# Patient Record
(demographics unavailable — no encounter records)

---

## 2024-10-24 NOTE — DISCUSSION/SUMMARY
[FreeTextEntry1] : Pt with fever in setting of rhinoenterovirus infection with cough. Advised to resume albuterol for nighttime cough and continue every 4 hours if notable improvement. Also possible post-nasal drip component to cough, will send flonase. Expectant care. Follow up as needed for new fever trend or worsening symptoms. Return precautions discussed if fever continues or does not trend down.

## 2024-10-24 NOTE — PHYSICAL EXAM
[Alert] : alert [EOMI] : grossly EOMI [Conjuctival Injection] : no conjunctival injection [Erythema] : no erythema [Bulging] : not bulging [Erythematous Oropharynx] : nonerythematous oropharynx [Clear to Auscultation Bilaterally] : clear to auscultation bilaterally [Wheezing] : no wheezing [Rales] : no rales [Rhonchi] : no rhonchi [No Abnormal Lymph Nodes Palpated] : no abnormal lymph nodes palpated [NL] : warm, clear [FreeTextEntry1] : in wheelchair [FreeTextEntry3] : scarring on TM [FreeTextEntry4] : congestion

## 2024-10-24 NOTE — HISTORY OF PRESENT ILLNESS
[de-identified] : hfu [FreeTextEntry6] : Diagnosed with REV at [facility], has cough, congestion, fever x 3-4 days. Had fever this morning to 101F. Mom has given Tylenol for fever control. Pt also complaining about ear pain. No changes in appetite or behavior. Coughing waking her up at night.

## 2024-10-24 NOTE — HISTORY OF PRESENT ILLNESS
[de-identified] : hfu [FreeTextEntry6] : Diagnosed with REV at [facility], has cough, congestion, fever x 3-4 days. Had fever this morning to 101F. Mom has given Tylenol for fever control. Pt also complaining about ear pain. No changes in appetite or behavior. Coughing waking her up at night.

## 2024-11-05 NOTE — HISTORY OF PRESENT ILLNESS
[Mother] : mother [Fat free ___ oz/d] : consumes [unfilled] oz of fat free cow's milk per day [Fruit] : fruit [Vegetables] : vegetables [Meat] : meat [Eggs] : eggs [Fish] : fish [___ stools every other day] : [unfilled]  stools every other day [Firm] : stools are firm consistency [Brushing teeth] : Brushing teeth [No] : Patient does not go to dentist yearly [Vitamin] : Primary Fluoride Source: Vitamin [In nursery school] : In nursery school [Playtime (60 min/d)] : Playtime 60 min a day [Child given choices] : Child given choices [Up to date] : Up to date

## 2024-11-11 NOTE — PHYSICAL EXAM
[Acute Distress] : no acute distress [Cerumen in canal] : cerumen in canal [Discharge in canal] : discharge in canal [Inflammation of canal] : inflammation of canal [Erythema of canal] : erythema of canal [Bilateral] : (bilateral) [NL] : soft, nontender, nondistended, normal bowel sounds, no hepatosplenomegaly [No Abnormal Lymph Nodes Palpated] : no abnormal lymph nodes palpated [Warm] : warm

## 2024-11-11 NOTE — HISTORY OF PRESENT ILLNESS
[de-identified] : Fever, Ear Pain [FreeTextEntry6] : SALO GUZMAN is a 3 year old female presenting for complaints of ear wax dripping from the ear and fever this morning.  Decreased appetite, drinking well.  No other symptoms.

## 2024-11-11 NOTE — DISCUSSION/SUMMARY
[FreeTextEntry1] : 3 yo here with febrile illness and B otitis externa.  Patient has been diagnosed with otitis externa or swimmer's ear. Use ear drops as prescribed and avoid contact with water. After showering, can use a blow-dryer on cool to dry excess fluid from the canal (with assistance from the parent) Pain should resolve within 36-48 hours after initiation of therapy. If pain is not improving, patient should contact provider. Can use OTC pain medication such as Tylenol or Ibuprofen as needed for pain.  Discussed supportive care for fever, likely viral etiology.  Increased fluids can treat Tylenol/Motrin PRN.  Advised f/u if fever persists symptoms worsen.

## 2024-11-20 NOTE — DISCUSSION/SUMMARY
[FreeTextEntry1] : Pt with perforated TM in right ear and left AOM. Mom endorses ear drainage from right ear previously. Will send Amoxicillin x 10 day course for TM perforation. Complete antibiotic course. Potential side effect of antibiotics includes but not limited to diarrhea. Provide ibuprofen as needed for pain or fever. If no improvement within 48 hours return for re-evaluation.  Odomzo Counseling- I discussed with the patient the risks of Odomzo including but not limited to nausea, vomiting, diarrhea, constipation, weight loss, changes in the sense of taste, decreased appetite, muscle spasms, and hair loss.  The patient verbalized understanding of the proper use and possible adverse effects of Odomzo.  All of the patient's questions and concerns were addressed.

## 2024-11-20 NOTE — HISTORY OF PRESENT ILLNESS
[de-identified] : right ear pain and cough [FreeTextEntry6] : Still complaining of right ear pain and mild cough. Did not apply ear drops as per last note, mom was not aware drops were sent. No fevers.   Reviewed prior notes, meds.

## 2024-11-20 NOTE — PHYSICAL EXAM
[Bulging] : bulging [Erythema] : erythema [Purulent Effusion] : no purulent effusion [Perforated] : perforated [No Abnormal Lymph Nodes Palpated] : no abnormal lymph nodes palpated [NL] : warm, clear

## 2024-11-20 NOTE — HISTORY OF PRESENT ILLNESS
[de-identified] : right ear pain and cough [FreeTextEntry6] : Still complaining of right ear pain and mild cough. Did not apply ear drops as per last note, mom was not aware drops were sent. No fevers.   Reviewed prior notes, meds.

## 2024-11-20 NOTE — DISCUSSION/SUMMARY
[FreeTextEntry1] : Pt with perforated TM in right ear and left AOM. Mom endorses ear drainage from right ear previously. Will send Amoxicillin x 10 day course for TM perforation. Complete antibiotic course. Potential side effect of antibiotics includes but not limited to diarrhea. Provide ibuprofen as needed for pain or fever. If no improvement within 48 hours return for re-evaluation.

## 2025-01-07 NOTE — PHYSICAL EXAM
[Clear] : right tympanic membrane clear [Clear Effusion] : clear effusion [Erythema] : no erythema [Bulging] : not bulging [Purulent Effusion] : no purulent effusion [Clear to Auscultation Bilaterally] : clear to auscultation bilaterally [Wheezing] : no wheezing [Rales] : no rales [Crackles] : no crackles [Rhonchi] : no rhonchi [NL] : regular rate and rhythm, normal S1, S2 audible, no murmurs [FreeTextEntry3] : cerumen in left ear canal, cleared with ear curette

## 2025-01-07 NOTE — HISTORY OF PRESENT ILLNESS
[de-identified] : diarrhea, fever, chills, ear pain [FreeTextEntry6] : Began having fever and chills yesterday evening. Complained of right ear pain. Had multiple watery loose stools overnight. No vomiting. No cough or congestion.  Hospitalized 1 month ago for HMPV, required respiratory support.

## 2025-01-07 NOTE — DISCUSSION/SUMMARY
[FreeTextEntry1] : Pt with likely viral illness vs viral gastroenteritis. Will send swab for testing given recent hospitalization for respiratory distress. Consider tamiflu if flu+ (still within 24hrs of symptom onset). Continue supportive care and maintaining hydration in setting of diarrheal illness. Return precautions given for ear pain, fever, or worsening/concerning changes.

## 2025-01-10 NOTE — PHYSICAL EXAM
[Acute Distress] : no acute distress [Alert] : alert [Toxic] : not toxic [Conjuctival Injection] : conjunctival injection [Discharge] : discharge [Right] : (right) [Erythema] : no erythema [Bulging] : bulging [Purulent Effusion] : purulent effusion [FreeTextEntry3] : left TM perforation, right TM bullous myringitis

## 2025-01-10 NOTE — DISCUSSION/SUMMARY
[FreeTextEntry1] : Pt with bilateral AOM seen on exam, with secondary bacterial conjunctivitis. Will treat with PO Cefdinir BID x 10 days and send Polytrim drops for conjunctivitis. Complete antibiotic course. Potential side effect of antibiotics includes but not limited to diarrhea. Provide ibuprofen as needed for pain or fever. If no improvement within 48 hours return for re-evaluation.   Untreated ear infections may lead to: Permanent hearing loss Problems with speech and language development Spread of infection to neighboring tissues and organs, such as mastoiditis or meningitis  Treat as directed and follow up as needed for fever trend, new, or worsening symptoms. Call or return if rash or significant vomiting develops after starting medication. Some side effects of medication that may not need special treatments include loose BMs.

## 2025-01-10 NOTE — HISTORY OF PRESENT ILLNESS
[de-identified] : R EYE DC [FreeTextEntry6] : Having right eye discharge since yesterday. Was complaining of right ear pain and then stopped. Diarrhea has improved, no stools since yesterday. Eating and drinking. No fevers.

## 2025-03-14 NOTE — HISTORY OF PRESENT ILLNESS
[de-identified] : Stomach pain  [FreeTextEntry6] : Pt with intermittent abdominal pain x 2 weeks on and off. Will point to RLQ for pain. Mom has noticed fishy odor to urine but has not complained of pain with urination. Currently potty training. Two weeks ago had loose stools but they self resolved, now stooling normally. No pain with BM. No fevers, vomiting, nausea. Eating and drinking normally. No throat pain.

## 2025-03-14 NOTE — HISTORY OF PRESENT ILLNESS
[de-identified] : Stomach pain  [FreeTextEntry6] : Pt with intermittent abdominal pain x 2 weeks on and off. Will point to RLQ for pain. Mom has noticed fishy odor to urine but has not complained of pain with urination. Currently potty training. Two weeks ago had loose stools but they self resolved, now stooling normally. No pain with BM. No fevers, vomiting, nausea. Eating and drinking normally. No throat pain.

## 2025-03-14 NOTE — ADDENDUM
[FreeTextEntry1] : Mother called to state she had another loose stool. Will send stool PCR and call mom back.

## 2025-03-14 NOTE — HISTORY OF PRESENT ILLNESS
[de-identified] : Stomach pain  [FreeTextEntry6] : Pt with intermittent abdominal pain x 2 weeks on and off. Will point to RLQ for pain. Mom has noticed fishy odor to urine but has not complained of pain with urination. Currently potty training. Two weeks ago had loose stools but they self resolved, now stooling normally. No pain with BM. No fevers, vomiting, nausea. Eating and drinking normally. No throat pain.

## 2025-03-14 NOTE — DISCUSSION/SUMMARY
[FreeTextEntry1] : Pt with intermittent RLQ abdominal pain in setting of recent diarrheal illness and malodorous urine. Urine bag placed for clean catch, POC UA negative. Advised to eliminate dairy for now, may be causing pain/discomfort with recent GI illness. No suspicion for strep today. Low suspicion for intraabdominal process, overall benign physical exam. Consider abdominal US to evaluate for possible appendicitis given location of abdominal pain.

## 2025-03-14 NOTE — PHYSICAL EXAM
[Soft] : soft [Normal Bowel Sounds] : normal bowel sounds [Normal External Genitalia] : normal external genitalia [NL] : warm, clear [Erythematous Oropharynx] : nonerythematous oropharynx [Enlarged Tonsils] : tonsils not enlarged [Vesicles] : no vesicles [Exudate] : no exudate [Ulcerative Lesions] : no ulcerative lesions [Palate petechiae] : palate without petechiae [Tender] : nontender [Distended] : nondistended [Tenderness with Palpation] : no tenderness with palpation [Vaginal Discharge] : no vaginal discharge

## 2025-05-12 NOTE — PHYSICAL EXAM
[Clear] : right tympanic membrane clear [Erythema] : erythema [Purulent Effusion] : purulent effusion [Mucoid Discharge] : mucoid discharge [NL] : warm, clear

## 2025-05-12 NOTE — DISCUSSION/SUMMARY
[FreeTextEntry1] : Anticipatory guidance and parent education given. Take antibiotic as prescribed. Supportive care. Tylenol or Motrin as needed for discomfort/fever. Nasal saline and suction as needed. Follow up in 3-4 weeks for re-check of affected ear(s). Follow up sooner if symptoms persist or worsen.

## 2025-05-12 NOTE — HISTORY OF PRESENT ILLNESS
[de-identified] : Runny nose, Cough, Congestion [FreeTextEntry6] : BIB mom with c/o cough and congestion; no fever

## 2025-06-25 NOTE — PHYSICAL EXAM
[Cerumen in canal] : cerumen in canal [Right] : (right) [Clear Effusion] : clear effusion [Mucoid Discharge] : mucoid discharge [Inflamed Nasal Mucosa] : inflamed nasal mucosa [Erythematous Oropharynx] : erythematous oropharynx [Cobblestoning] : cobblestoning of posterior pharynx [Clear to Auscultation Bilaterally] : clear to auscultation bilaterally [NL] : warm, clear [Acute Distress] : no acute distress [Tired appearing] : not tired appearing [Erythema] : no erythema

## 2025-06-25 NOTE — CARE PLAN
Caregiver Annaayana Hill notified [facility] Form is ready for p/u at the [place] office. [Care Plan reviewed and provided to patient/caregiver] : Care plan reviewed and provided to patient/caregiver [Understands and communicates without difficulty] : Patient/Caregiver understands and communicates without difficulty

## 2025-06-25 NOTE — HISTORY OF PRESENT ILLNESS
[de-identified] : cough [FreeTextEntry6] : Lilliam, a 3-year-old female, presents with her parent due to fever and cough that began last night. The cough is described as 'bad' and 'hacking', and has been continuous throughout the day. The patient experienced a fever last night, reaching 101F, but the current temperature is 98.3F. The parent reports that Lilliam has had a runny nose with green nasal discharge for the past 2-3 weeks, which has been intermittent with occasional days of improvement. The parent has been using nasal suction at home to manage the discharge. Lilliam has a history of using a nebulizer but is not currently using it. She attends school.

## 2025-06-25 NOTE — HISTORY OF PRESENT ILLNESS
[de-identified] : cough [FreeTextEntry6] : Lilliam, a 3-year-old female, presents with her parent due to fever and cough that began last night. The cough is described as 'bad' and 'hacking', and has been continuous throughout the day. The patient experienced a fever last night, reaching 101F, but the current temperature is 98.3F. The parent reports that Lilliam has had a runny nose with green nasal discharge for the past 2-3 weeks, which has been intermittent with occasional days of improvement. The parent has been using nasal suction at home to manage the discharge. Lilliam has a history of using a nebulizer but is not currently using it. She attends school.

## 2025-06-25 NOTE — DISCUSSION/SUMMARY
[FreeTextEntry1] : Assessment and Plan:   - **Purulent Rhinitis with Post-Nasal Drip:** The patient is diagnosed with an upper respiratory tract infection based on the persistent cough, nasal congestion, and history of green nasal discharge. The cough is likely due to post-nasal drip from mucus accumulation in the upper airway.     - Prescribe antibiotic, Augmentin, to prevent potential infection and help break up mucus      - Recommend using albuterol nebulizer twice daily to prevent chest involvement      - Prescribe bromfed (cough syrup decongestant) for nighttime use to manage post-nasal drip      - Advise continued use of nasal suction at home      - Recommend follow-up if symptoms persist or worsen      - Consider referral to ENT specialist if recurrent issues continue    - **Otitis Media with Effusion:** Fluid noted in the left ear, although no active infection is present. This condition requires monitoring due to the risk of developing into an ear infection.     - Monitor for signs of ear infection development      - Include ear examination in follow-up visits      - Educate parents on signs of ear infection to watch for